# Patient Record
Sex: FEMALE | Race: BLACK OR AFRICAN AMERICAN | NOT HISPANIC OR LATINO
[De-identification: names, ages, dates, MRNs, and addresses within clinical notes are randomized per-mention and may not be internally consistent; named-entity substitution may affect disease eponyms.]

---

## 2017-10-24 ENCOUNTER — HOSPITAL (OUTPATIENT)
Dept: OTHER | Age: 27
End: 2017-10-24
Attending: OBSTETRICS & GYNECOLOGY

## 2017-10-24 LAB
ANALYZER ANC (IANC): ABNORMAL
ERYTHROCYTE [DISTWIDTH] IN BLOOD: 13.5 % (ref 11–15)
HEMATOCRIT: 35 % (ref 36–46.5)
HGB BLD-MCNC: 12 GM/DL (ref 12–15.5)
MCH RBC QN AUTO: 31.5 PG (ref 26–34)
MCHC RBC AUTO-ENTMCNC: 34.3 GM/DL (ref 32–36.5)
MCV RBC AUTO: 91.9 FL (ref 78–100)
PLATELET # BLD: 186 THOUSAND/MCL (ref 140–450)
RBC # BLD: 3.81 MILLION/MCL (ref 4–5.2)
WBC # BLD: 10.2 THOUSAND/MCL (ref 4.2–11)

## 2017-10-25 LAB
ANALYZER ANC (IANC): ABNORMAL
ERYTHROCYTE [DISTWIDTH] IN BLOOD: 13.8 % (ref 11–15)
HEMATOCRIT: 31.6 % (ref 36–46.5)
HGB BLD-MCNC: 10.9 GM/DL (ref 12–15.5)
MCH RBC QN AUTO: 32 PG (ref 26–34)
MCHC RBC AUTO-ENTMCNC: 34.5 GM/DL (ref 32–36.5)
MCV RBC AUTO: 92.7 FL (ref 78–100)
PLATELET # BLD: 176 THOUSAND/MCL (ref 140–450)
RBC # BLD: 3.41 MILLION/MCL (ref 4–5.2)
WBC # BLD: 7.9 THOUSAND/MCL (ref 4.2–11)

## 2018-09-27 PROCEDURE — 87389 HIV-1 AG W/HIV-1&-2 AB AG IA: CPT | Performed by: OBSTETRICS & GYNECOLOGY

## 2018-09-27 PROCEDURE — 86900 BLOOD TYPING SEROLOGIC ABO: CPT | Performed by: OBSTETRICS & GYNECOLOGY

## 2018-09-27 PROCEDURE — 86780 TREPONEMA PALLIDUM: CPT | Performed by: OBSTETRICS & GYNECOLOGY

## 2018-09-27 PROCEDURE — 86850 RBC ANTIBODY SCREEN: CPT | Performed by: OBSTETRICS & GYNECOLOGY

## 2018-09-27 PROCEDURE — 86762 RUBELLA ANTIBODY: CPT | Performed by: OBSTETRICS & GYNECOLOGY

## 2018-09-27 PROCEDURE — 86901 BLOOD TYPING SEROLOGIC RH(D): CPT | Performed by: OBSTETRICS & GYNECOLOGY

## 2019-01-14 PROCEDURE — 87389 HIV-1 AG W/HIV-1&-2 AB AG IA: CPT | Performed by: OBSTETRICS & GYNECOLOGY

## 2019-01-14 PROCEDURE — 86780 TREPONEMA PALLIDUM: CPT | Performed by: OBSTETRICS & GYNECOLOGY

## 2019-01-26 ENCOUNTER — HOSPITAL ENCOUNTER (OUTPATIENT)
Facility: HOSPITAL | Age: 29
Setting detail: OBSERVATION
Discharge: HOME | End: 2019-01-26
Attending: OBSTETRICS & GYNECOLOGY | Admitting: OBSTETRICS & GYNECOLOGY
Payer: MEDICAID

## 2019-01-26 VITALS
WEIGHT: 186 LBS | DIASTOLIC BLOOD PRESSURE: 89 MMHG | SYSTOLIC BLOOD PRESSURE: 136 MMHG | TEMPERATURE: 98 F | RESPIRATION RATE: 16 BRPM | HEIGHT: 67 IN | HEART RATE: 90 BPM | BODY MASS INDEX: 29.19 KG/M2

## 2019-01-26 PROBLEM — Z34.90 PREGNANCY: Status: ACTIVE | Noted: 2019-01-26

## 2019-01-26 LAB
BILIRUBIN URINE: NEGATIVE
BLOOD URINE: NEGATIVE
CONTROL RUN WITHIN 24 HOURS?: YES
GLUCOSE URINE: NEGATIVE
KETONE URINE: NEGATIVE
LEUKOCYTE ESTERASE URINE: NEGATIVE
NITRITE URINE: NEGATIVE
PH URINE: 6.5 (ref 5–8)
PROTEIN URINE: NEGATIVE
SPEC GRAVITY: 1.01 (ref 1–1.03)
UROBILINOGEN URINE: 0.2

## 2019-01-26 PROCEDURE — 81002 URINALYSIS NONAUTO W/O SCOPE: CPT

## 2019-01-26 PROCEDURE — 99203 OFFICE O/P NEW LOW 30 MIN: CPT

## 2019-01-26 NOTE — PROGRESS NOTES
Received pt to the unit via w/c with ctx. Pt to triage room changed and voided. Pt into bed that is in the low locked position. efm explained and then applied. Pt is a 34 yo  with 4 prev vag deliveries at term, no complocations.   Pt states at 2230-

## 2019-01-26 NOTE — PROGRESS NOTES
efm was removed after exam.  Pt changed. D/c instructions explained to the pt. Pt in agreement. All questions answeres.   Pt to home in stable cond

## 2019-01-26 NOTE — PROGRESS NOTES
In room with pt. Pt informed of d/c. Pt uncomfortable with that w/o sve. Pt states that she is feeling some abd pain. rn palpated abdomen at bedside 10 min. No ctx palpated, fetla movement. Explained to the pt round ligament pain.  Informed I will not

## 2019-03-15 ENCOUNTER — HOSPITAL ENCOUNTER (OUTPATIENT)
Facility: HOSPITAL | Age: 29
Setting detail: OBSERVATION
Discharge: HOME | End: 2019-03-15
Attending: OBSTETRICS & GYNECOLOGY | Admitting: OBSTETRICS & GYNECOLOGY
Payer: MEDICAID

## 2019-03-15 VITALS
SYSTOLIC BLOOD PRESSURE: 121 MMHG | HEIGHT: 67 IN | DIASTOLIC BLOOD PRESSURE: 90 MMHG | BODY MASS INDEX: 30 KG/M2 | HEART RATE: 101 BPM

## 2019-03-15 LAB
ALBUMIN SERPL-MCNC: 2.7 G/DL (ref 3.4–5)
ALBUMIN/GLOB SERPL: 0.7 {RATIO} (ref 1–2)
ALP LIVER SERPL-CCNC: 111 U/L (ref 37–98)
ALT SERPL-CCNC: 14 U/L (ref 13–56)
ANION GAP SERPL CALC-SCNC: 7 MMOL/L (ref 0–18)
AST SERPL-CCNC: 14 U/L (ref 15–37)
BASOPHILS # BLD AUTO: 0.03 X10(3) UL (ref 0–0.2)
BASOPHILS NFR BLD AUTO: 0.4 %
BILIRUB SERPL-MCNC: 0.8 MG/DL (ref 0.1–2)
BUN BLD-MCNC: 6 MG/DL (ref 7–18)
BUN/CREAT SERPL: 10.3 (ref 10–20)
CALCIUM BLD-MCNC: 8.6 MG/DL (ref 8.5–10.1)
CHLORIDE SERPL-SCNC: 107 MMOL/L (ref 98–107)
CO2 SERPL-SCNC: 24 MMOL/L (ref 21–32)
CREAT BLD-MCNC: 0.58 MG/DL (ref 0.55–1.02)
CREAT UR-SCNC: 295 MG/DL
DEPRECATED RDW RBC AUTO: 42.8 FL (ref 35.1–46.3)
EOSINOPHIL # BLD AUTO: 0.04 X10(3) UL (ref 0–0.7)
EOSINOPHIL NFR BLD AUTO: 0.6 %
ERYTHROCYTE [DISTWIDTH] IN BLOOD BY AUTOMATED COUNT: 12.9 % (ref 11–15)
GLOBULIN PLAS-MCNC: 4.1 G/DL (ref 2.8–4.4)
GLUCOSE BLD-MCNC: 79 MG/DL (ref 70–99)
HCT VFR BLD AUTO: 34.8 % (ref 35–48)
HGB BLD-MCNC: 11.9 G/DL (ref 12–16)
IMM GRANULOCYTES # BLD AUTO: 0.02 X10(3) UL (ref 0–1)
IMM GRANULOCYTES NFR BLD: 0.3 %
LYMPHOCYTES # BLD AUTO: 2.54 X10(3) UL (ref 1–4)
LYMPHOCYTES NFR BLD AUTO: 35.9 %
M PROTEIN MFR SERPL ELPH: 6.8 G/DL (ref 6.4–8.2)
MCH RBC QN AUTO: 31.5 PG (ref 26–34)
MCHC RBC AUTO-ENTMCNC: 34.2 G/DL (ref 31–37)
MCV RBC AUTO: 92.1 FL (ref 80–100)
MONOCYTES # BLD AUTO: 0.49 X10(3) UL (ref 0.1–1)
MONOCYTES NFR BLD AUTO: 6.9 %
NEUTROPHILS # BLD AUTO: 3.95 X10 (3) UL (ref 1.5–7.7)
NEUTROPHILS # BLD AUTO: 3.95 X10(3) UL (ref 1.5–7.7)
NEUTROPHILS NFR BLD AUTO: 55.9 %
OSMOLALITY SERPL CALC.SUM OF ELEC: 283 MOSM/KG (ref 275–295)
PLATELET # BLD AUTO: 193 10(3)UL (ref 150–450)
POTASSIUM SERPL-SCNC: 3.8 MMOL/L (ref 3.5–5.1)
PROT UR-MCNC: 15.6 MG/DL
PROT/CREAT UR-RTO: 0.05
RBC # BLD AUTO: 3.78 X10(6)UL (ref 3.8–5.3)
SODIUM SERPL-SCNC: 138 MMOL/L (ref 136–145)
URATE SERPL-MCNC: 2.7 MG/DL (ref 2.6–6)
WBC # BLD AUTO: 7.1 X10(3) UL (ref 4–11)

## 2019-03-15 PROCEDURE — 80053 COMPREHEN METABOLIC PANEL: CPT | Performed by: OBSTETRICS & GYNECOLOGY

## 2019-03-15 PROCEDURE — 84156 ASSAY OF PROTEIN URINE: CPT | Performed by: OBSTETRICS & GYNECOLOGY

## 2019-03-15 PROCEDURE — 59025 FETAL NON-STRESS TEST: CPT

## 2019-03-15 PROCEDURE — 36415 COLL VENOUS BLD VENIPUNCTURE: CPT

## 2019-03-15 PROCEDURE — 85025 COMPLETE CBC W/AUTO DIFF WBC: CPT | Performed by: OBSTETRICS & GYNECOLOGY

## 2019-03-15 PROCEDURE — 84550 ASSAY OF BLOOD/URIC ACID: CPT | Performed by: OBSTETRICS & GYNECOLOGY

## 2019-03-15 PROCEDURE — 82570 ASSAY OF URINE CREATININE: CPT | Performed by: OBSTETRICS & GYNECOLOGY

## 2019-03-15 PROCEDURE — 99213 OFFICE O/P EST LOW 20 MIN: CPT

## 2019-03-15 NOTE — PROGRESS NOTES
Pt given verbal and written discharge instructions and verbalized understanding of them. Pt to call the doctors office today to get an appt for a blood pressure check on Monday 03/18/19.   Pt home ambultory with  and 2 small children in no apparent d

## 2019-03-15 NOTE — NST
Nonstress Test   Patient: Ayo Coe    Gestation: 36w5d    NST:       Variability: Moderate           Accelerations: Yes           Decelerations: None            Baseline: 130 BPM           Uterine Irritability: No           Contractions: Irregular

## 2019-03-18 PROBLEM — O99.820 GROUP B STREPTOCOCCUS CARRIER, +RV CULTURE, CURRENTLY PREGNANT: Status: ACTIVE | Noted: 2019-03-18

## 2019-04-08 ENCOUNTER — TELEPHONE (OUTPATIENT)
Dept: OBGYN UNIT | Facility: HOSPITAL | Age: 29
End: 2019-04-08

## 2019-04-12 ENCOUNTER — APPOINTMENT (OUTPATIENT)
Dept: OBGYN CLINIC | Facility: HOSPITAL | Age: 29
End: 2019-04-12
Payer: MEDICAID

## 2019-04-12 ENCOUNTER — HOSPITAL ENCOUNTER (INPATIENT)
Facility: HOSPITAL | Age: 29
LOS: 1 days | Discharge: HOME | End: 2019-04-13
Attending: OBSTETRICS & GYNECOLOGY | Admitting: OBSTETRICS & GYNECOLOGY
Payer: MEDICAID

## 2019-04-12 PROCEDURE — 86900 BLOOD TYPING SEROLOGIC ABO: CPT | Performed by: OBSTETRICS & GYNECOLOGY

## 2019-04-12 PROCEDURE — 3E033VJ INTRODUCTION OF OTHER HORMONE INTO PERIPHERAL VEIN, PERCUTANEOUS APPROACH: ICD-10-PCS | Performed by: OBSTETRICS & GYNECOLOGY

## 2019-04-12 PROCEDURE — 10907ZC DRAINAGE OF AMNIOTIC FLUID, THERAPEUTIC FROM PRODUCTS OF CONCEPTION, VIA NATURAL OR ARTIFICIAL OPENING: ICD-10-PCS | Performed by: OBSTETRICS & GYNECOLOGY

## 2019-04-12 PROCEDURE — 86780 TREPONEMA PALLIDUM: CPT | Performed by: OBSTETRICS & GYNECOLOGY

## 2019-04-12 PROCEDURE — 86901 BLOOD TYPING SEROLOGIC RH(D): CPT | Performed by: OBSTETRICS & GYNECOLOGY

## 2019-04-12 PROCEDURE — 86850 RBC ANTIBODY SCREEN: CPT | Performed by: OBSTETRICS & GYNECOLOGY

## 2019-04-12 PROCEDURE — 85027 COMPLETE CBC AUTOMATED: CPT | Performed by: OBSTETRICS & GYNECOLOGY

## 2019-04-12 RX ORDER — TRISODIUM CITRATE DIHYDRATE AND CITRIC ACID MONOHYDRATE 500; 334 MG/5ML; MG/5ML
30 SOLUTION ORAL AS NEEDED
Status: DISCONTINUED | OUTPATIENT
Start: 2019-04-12 | End: 2019-04-12

## 2019-04-12 RX ORDER — MISOPROSTOL 200 UG/1
TABLET ORAL
Status: COMPLETED
Start: 2019-04-12 | End: 2019-04-12

## 2019-04-12 RX ORDER — ZOLPIDEM TARTRATE 5 MG/1
5 TABLET ORAL NIGHTLY PRN
Status: DISCONTINUED | OUTPATIENT
Start: 2019-04-12 | End: 2019-04-14

## 2019-04-12 RX ORDER — SODIUM CHLORIDE, SODIUM LACTATE, POTASSIUM CHLORIDE, CALCIUM CHLORIDE 600; 310; 30; 20 MG/100ML; MG/100ML; MG/100ML; MG/100ML
INJECTION, SOLUTION INTRAVENOUS CONTINUOUS
Status: DISCONTINUED | OUTPATIENT
Start: 2019-04-12 | End: 2019-04-12

## 2019-04-12 RX ORDER — DOCUSATE SODIUM 100 MG/1
100 CAPSULE, LIQUID FILLED ORAL 2 TIMES DAILY PRN
Status: DISCONTINUED | OUTPATIENT
Start: 2019-04-12 | End: 2019-04-14

## 2019-04-12 RX ORDER — SIMETHICONE 80 MG
80 TABLET,CHEWABLE ORAL 3 TIMES DAILY PRN
Status: DISCONTINUED | OUTPATIENT
Start: 2019-04-12 | End: 2019-04-14

## 2019-04-12 RX ORDER — IBUPROFEN 600 MG/1
600 TABLET ORAL ONCE AS NEEDED
Status: DISCONTINUED | OUTPATIENT
Start: 2019-04-12 | End: 2019-04-12

## 2019-04-12 RX ORDER — DEXTROSE, SODIUM CHLORIDE, SODIUM LACTATE, POTASSIUM CHLORIDE, AND CALCIUM CHLORIDE 5; .6; .31; .03; .02 G/100ML; G/100ML; G/100ML; G/100ML; G/100ML
INJECTION, SOLUTION INTRAVENOUS AS NEEDED
Status: DISCONTINUED | OUTPATIENT
Start: 2019-04-12 | End: 2019-04-12

## 2019-04-12 RX ORDER — BISACODYL 10 MG
10 SUPPOSITORY, RECTAL RECTAL ONCE AS NEEDED
Status: ACTIVE | OUTPATIENT
Start: 2019-04-12 | End: 2019-04-12

## 2019-04-12 RX ORDER — ACETAMINOPHEN 325 MG/1
650 TABLET ORAL EVERY 6 HOURS PRN
Status: DISCONTINUED | OUTPATIENT
Start: 2019-04-12 | End: 2019-04-14

## 2019-04-12 RX ORDER — MISOPROSTOL 200 UG/1
1000 TABLET ORAL ONCE AS NEEDED
Status: DISCONTINUED | OUTPATIENT
Start: 2019-04-12 | End: 2019-04-12

## 2019-04-12 RX ORDER — TERBUTALINE SULFATE 1 MG/ML
0.25 INJECTION, SOLUTION SUBCUTANEOUS AS NEEDED
Status: DISCONTINUED | OUTPATIENT
Start: 2019-04-12 | End: 2019-04-12

## 2019-04-12 RX ORDER — AMMONIA INHALANTS 0.04 G/.3ML
0.3 INHALANT RESPIRATORY (INHALATION) AS NEEDED
Status: DISCONTINUED | OUTPATIENT
Start: 2019-04-12 | End: 2019-04-12

## 2019-04-12 RX ORDER — IBUPROFEN 600 MG/1
600 TABLET ORAL EVERY 6 HOURS
Status: DISCONTINUED | OUTPATIENT
Start: 2019-04-12 | End: 2019-04-14

## 2019-04-12 NOTE — H&P
[facility]  History & Physical    SUBJECTIVE:    Evelyn Griffith is a 33year old [id number] at 40w5d who presents for postdates IOL.     Obstetric History:    OB History    Para Term  AB Living   6 4 4   1 4   SAB TAB Ectopic Multiple Live reassuring    PLAN:  Admit  Pitocin  Antibiotic for GBS pos status

## 2019-04-12 NOTE — PROGRESS NOTES
Feels contractions, not uncomfortable  cx very posterior, cannot palpate  Head +1 station  Continue oxytocin and GBBS prophylaxis

## 2019-04-13 VITALS
BODY MASS INDEX: 31 KG/M2 | DIASTOLIC BLOOD PRESSURE: 95 MMHG | WEIGHT: 197 LBS | HEART RATE: 85 BPM | TEMPERATURE: 98 F | SYSTOLIC BLOOD PRESSURE: 133 MMHG | RESPIRATION RATE: 17 BRPM

## 2019-04-13 PROCEDURE — 85025 COMPLETE CBC W/AUTO DIFF WBC: CPT | Performed by: OBSTETRICS & GYNECOLOGY

## 2019-04-13 NOTE — PLAN OF CARE
Problem: BIRTH - VAGINAL/ SECTION  Goal: Fetal and maternal status remain reassuring during the birth process  Description  INTERVENTIONS:  - Monitor vital signs  - Monitor fetal heart rate  - Monitor uterine activity  - Monitor labor progression ambulation and provide assistance as needed. - Assess and monitor emotional status and provide social service/psych resources as needed. - Utilize standard precautions and use personal protective equipment as indicated.  Ensure aseptic care of all intrave supply with medication/procedure interruptions  Description  INTERVENTIONS:  - Review techniques for milk expression (breast pumping). - Provide pumping equipment/supplies, instructions, and assistance until it is safe to breastfeed infant.   Outcome: Pro

## 2019-04-13 NOTE — PLAN OF CARE
Problem: BIRTH - VAGINAL/ SECTION  Goal: Fetal and maternal status remain reassuring during the birth process  Description  INTERVENTIONS:  - Monitor vital signs  - Monitor fetal heart rate  - Monitor uterine activity  - Monitor labor progression ambulation and provide assistance as needed. - Assess and monitor emotional status and provide social service/psych resources as needed. - Utilize standard precautions and use personal protective equipment as indicated.  Ensure aseptic care of all intrave with medication/procedure interruptions  Description  INTERVENTIONS:  - Review techniques for milk expression (breast pumping). - Provide pumping equipment/supplies, instructions, and assistance until it is safe to breastfeed infant.   Outcome: Completed

## 2019-04-13 NOTE — PROGRESS NOTES
[facility] Vaginal Delivery Progress Note    Sara Wilkins Patient Status:  Inpatient    1990 MRN [id number]   [facility] 2SW-J Attending Cornell Sommers MD   Hosp Day # 1 PCP Quang Meza MD     SUBJECTIVE:    Post

## 2019-04-13 NOTE — PROGRESS NOTES
PT RECEIVED TO ROOM 2207 IN STABLE CONDITION VIA WHEELCHAIR. ID BANDS CHECKED AND VERIFIED WITH [facility] WILKERSON. PT ORIENTED TO ROOM, CALL LIGHT AND MOTHER BABY DISCHARGE FOLDER. PT WITHOUT COMPLAINTS AT THIS TIME.

## 2019-04-13 NOTE — PROGRESS NOTES
Pt transferred to mother baby unit via wheelchair, carrying infant, both in stable condition, to room 2207. ID bands checked and verified. Report given to Aster James RN.

## 2019-04-13 NOTE — L&D DELIVERY NOTE
Term IOL advanced cx dilation    This patient was admitted to the hospital and proceeded to have a vaginal birth. A 7 pound 1 ounce female infant was delivered. Head delivery was controlled. Fetal body delivered without difficulty.   Nares and mouth wer

## 2019-04-14 NOTE — PROGRESS NOTES
NURSING DISCHARGE NOTE    Discharged Home via Wheelchair with baby in arms. Accompanied by Family member and Support staff  Belongings Taken by patient/family. Pt understands to follow up with OB in 6 weeks, or sooner as needed.

## 2019-04-17 ENCOUNTER — HOSPITAL ENCOUNTER (OUTPATIENT)
Facility: HOSPITAL | Age: 29
Setting detail: OBSERVATION
Discharge: HOME | End: 2019-04-17
Attending: OBSTETRICS & GYNECOLOGY | Admitting: OBSTETRICS & GYNECOLOGY
Payer: MEDICAID

## 2019-04-17 ENCOUNTER — TELEPHONE (OUTPATIENT)
Dept: OBGYN UNIT | Facility: HOSPITAL | Age: 29
End: 2019-04-17

## 2019-04-17 VITALS
WEIGHT: 185 LBS | DIASTOLIC BLOOD PRESSURE: 91 MMHG | BODY MASS INDEX: 29 KG/M2 | HEART RATE: 100 BPM | SYSTOLIC BLOOD PRESSURE: 148 MMHG

## 2019-04-17 PROBLEM — R51.9 POSTPARTUM HEADACHE: Status: ACTIVE | Noted: 2019-04-17

## 2019-04-17 PROCEDURE — 36415 COLL VENOUS BLD VENIPUNCTURE: CPT

## 2019-04-17 PROCEDURE — 80053 COMPREHEN METABOLIC PANEL: CPT | Performed by: OBSTETRICS & GYNECOLOGY

## 2019-04-17 PROCEDURE — 99212 OFFICE O/P EST SF 10 MIN: CPT

## 2019-04-17 PROCEDURE — 81002 URINALYSIS NONAUTO W/O SCOPE: CPT

## 2019-04-17 PROCEDURE — 84550 ASSAY OF BLOOD/URIC ACID: CPT | Performed by: OBSTETRICS & GYNECOLOGY

## 2019-04-17 PROCEDURE — 85025 COMPLETE CBC W/AUTO DIFF WBC: CPT | Performed by: OBSTETRICS & GYNECOLOGY

## 2019-04-17 RX ORDER — NIFEDIPINE 20 MG/1
20 CAPSULE ORAL ONCE
Status: COMPLETED | OUTPATIENT
Start: 2019-04-17 | End: 2019-04-17

## 2019-04-17 RX ORDER — ACETAMINOPHEN 500 MG
TABLET ORAL
Status: DISCONTINUED
Start: 2019-04-17 | End: 2019-04-17

## 2019-04-17 RX ORDER — NIFEDIPINE 30 MG/1
30 TABLET, EXTENDED RELEASE ORAL DAILY
Qty: 30 TABLET | Refills: 1 | Status: SHIPPED | OUTPATIENT
Start: 2019-04-17

## 2019-04-17 RX ORDER — NIFEDIPINE 20 MG/1
CAPSULE ORAL
Status: COMPLETED
Start: 2019-04-17 | End: 2019-04-17

## 2019-04-17 RX ORDER — ACETAMINOPHEN 500 MG
1000 TABLET ORAL EVERY 6 HOURS PRN
Status: DISCONTINUED | OUTPATIENT
Start: 2019-04-17 | End: 2019-04-17

## 2019-04-17 NOTE — PROGRESS NOTES
Pt is a 33year old female admitted to triage 2 for Patient presents with:  Elevated BP  . Pt was seen in the office today w/ c/o headache. BP noted to be elevated and the patient was sent to LD for evaluation for r/o postpartum preeclampsia.  Pt presents

## 2019-04-17 NOTE — PROGRESS NOTES
Reviewed self and infant care w / mom, she verbalizes understanding of instructions reviewed. Encourage to follow up w/ MDs as directed and w/ questions/concerns. C/c strong headache and has already called OB, getting it checked later today.  Did review a

## 2019-04-18 NOTE — PROGRESS NOTES
Written and verbal discharge instructions given, questions answered and verbalized understanding. Patient instructed to f/u on Friday and pickup her prescription and start taking it tonight. Discussed s/s of PIH. Patient aware.  Discharged home in stable co

## 2019-05-09 ENCOUNTER — HOSPITAL ENCOUNTER (EMERGENCY)
Facility: HOSPITAL | Age: 29
Discharge: HOME | End: 2019-05-09
Attending: EMERGENCY MEDICINE
Payer: MEDICAID

## 2019-05-09 VITALS
HEIGHT: 67 IN | TEMPERATURE: 98 F | HEART RATE: 70 BPM | BODY MASS INDEX: 28.25 KG/M2 | DIASTOLIC BLOOD PRESSURE: 96 MMHG | SYSTOLIC BLOOD PRESSURE: 132 MMHG | RESPIRATION RATE: 17 BRPM | WEIGHT: 180 LBS | OXYGEN SATURATION: 97 %

## 2019-05-09 DIAGNOSIS — L50.9 HIVES: Primary | ICD-10-CM

## 2019-05-09 DIAGNOSIS — T78.40XA ALLERGIC REACTION, INITIAL ENCOUNTER: ICD-10-CM

## 2019-05-09 PROCEDURE — 99283 EMERGENCY DEPT VISIT LOW MDM: CPT | Performed by: EMERGENCY MEDICINE

## 2019-05-09 RX ORDER — PREDNISONE 20 MG/1
60 TABLET ORAL ONCE
Status: COMPLETED | OUTPATIENT
Start: 2019-05-09 | End: 2019-05-09

## 2019-05-09 RX ORDER — PREDNISONE 20 MG/1
40 TABLET ORAL DAILY
Qty: 10 TABLET | Refills: 0 | Status: SHIPPED | OUTPATIENT
Start: 2019-05-09 | End: 2019-05-14

## 2019-05-09 NOTE — ED INITIAL ASSESSMENT (HPI)
New BP med started due to post partum HTN. Patient started a rash and it has continued to spread to face , arms, legs trunk and face.  Hasnt taken it for 3 days

## 2019-05-09 NOTE — ED PROVIDER NOTES
Patient Seen in: [facility] Emergency Department    History   Patient presents with:  Rash Skin Problem (integumentary)  Medication Administration    Stated Complaint: hives and rash; recent BP med started    HPI    30-year-old female presents emerg No uvular edema. Scalp is atraumatic. NECK: Neck is supple, there is no nuchal rigidity. HEART: Regular rate and rhythm, no murmurs. LUNGS: Clear to auscultation bilaterally. No Rales, no rhonchi, no wheezing, no stridor.   ABDOMEN: Soft, nondistended,n

## 2019-05-23 PROBLEM — O99.820 GROUP B STREPTOCOCCUS CARRIER, +RV CULTURE, CURRENTLY PREGNANT: Status: RESOLVED | Noted: 2019-03-18 | Resolved: 2019-05-23

## 2019-05-23 PROBLEM — Z34.90 PREGNANCY: Status: RESOLVED | Noted: 2019-01-26 | Resolved: 2019-05-23

## (undated) NOTE — ED AVS SNAPSHOT
Pankaj Becker   MRN: [id number]    Department:  [facility] Emergency Department   Date of Visit:  5/9/2019           Disclosure     Insurance plans vary and the physician(s) referred by the ER may not be covered by your plan.  Please contact your tell this physician (or your personal doctor if your instructions are to return to your personal doctor) about any new or lasting problems. The primary care or specialist physician will see patients referred from the [facility] Emergency Department.  Iglesia Rdz